# Patient Record
Sex: MALE | URBAN - METROPOLITAN AREA
[De-identification: names, ages, dates, MRNs, and addresses within clinical notes are randomized per-mention and may not be internally consistent; named-entity substitution may affect disease eponyms.]

---

## 2023-02-15 ENCOUNTER — NURSE TRIAGE (OUTPATIENT)
Dept: ADMINISTRATIVE | Facility: CLINIC | Age: 33
End: 2023-02-15

## 2023-02-15 NOTE — TELEPHONE ENCOUNTER
Took vigra a couple hours ago and still have erection. Took it 4 hours ago. Pt was placed on hold and hung up. Triage nurse attempted to call pt on number he gave an it was incorrect.   Reason for Disposition   Caller hangs up    Protocols used: Difficult Caller-A-AH